# Patient Record
(demographics unavailable — no encounter records)

---

## 2024-10-18 NOTE — HISTORY OF PRESENT ILLNESS
[5] : 5 [Dull/Aching] : dull/aching [] : Post Surgical Visit: yes [de-identified] : R thumb UCL repair 3 weeks ago She is feeling better  [3] : 3 [FreeTextEntry1] : R thumb  [de-identified] : ot brace  [de-identified] : 09/25/24 [de-identified] : R thumb UCL repair

## 2024-11-08 NOTE — HISTORY OF PRESENT ILLNESS
[5] : 5 [3] : 3 [Dull/Aching] : dull/aching [] : Post Surgical Visit: yes [de-identified] : R thumb UCL repair  She is getting better  [FreeTextEntry1] : R thumb  [de-identified] : ot brace  [de-identified] : 09/25/24 [de-identified] : R thumb UCL repair

## 2024-11-08 NOTE — ASSESSMENT
[FreeTextEntry1] : Cont with therapy Activity modification as tolerated Ice as needed NSAIDs as needed Return in 4 weeks

## 2024-12-06 NOTE — HISTORY OF PRESENT ILLNESS
[Dull/Aching] : dull/aching [] : Post Surgical Visit: yes [de-identified] : R thumb UCL repair  9/25 She is feelingbetter Still has stiffness  [4] : 4 [2] : 2 [FreeTextEntry1] : R thumb  [de-identified] : therapy  [de-identified] : 09/25/24 [de-identified] : R thumb UCL repair

## 2025-07-03 NOTE — ASSESSMENT
[FreeTextEntry1] : The patient returns to discuss the left breast implant postmastectomy which she states is causing discomfort and pain.  As result we will schedule her for MRI to evaluate possible rupture.  The patient will also be seeking medical follow-up with her primary care physician and [facility] and is requesting appointment was a [facility] doctor.  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  As result a neurology consult would be scheduled.  Because of the fact that she was diagnosed with breast cancer at an early age less than 50 she would be a candidate for genetic testing which will be scheduled.  She will continue her medical, GI, GYN and surgical follow-up and testing as needed.  She will return to the office on a as needed basis as she has done well with no evidence of recurrent disease. [Curative] : Goals of care discussed with patient: Curative

## 2025-07-03 NOTE — HISTORY OF PRESENT ILLNESS
[de-identified] : This is a 61-year-old woman with history of carcinoma of the breast. Her history dates back to October 2002, when she was found to have a abnormal mammogram and biopsy revealed infiltrating ductal carcinoma. Patient underwent left mastectomy and the tumor was 2 x 1.8 cm with 7/22 positive lymph nodes, ER +80%, NH +80% HER-2 negative. The patient received sequential Adriamycin, Taxol and Cytoxan and subsequently has been well with no evidence of recurrence of her disease. She is now completing 9-1/2 years of aromatase inhibitor with exemestane. She tolerates her treatment well except for joint and bone pain. [de-identified] : 10/02-left breast mass IDC, ER,ND-80, Her neg, 7/22 nodes pos. [de-identified] : 10/02- Left mastx , AC'T chemo AI x 10 yrs. ROSA MARIA. [de-identified] : The patient returns to discuss her implant which is causing discomfort and pain.

## 2025-07-03 NOTE — ASSESSMENT
[FreeTextEntry1] : The patient returns to discuss the left breast implant postmastectomy which she states is causing discomfort and pain.  As result we will schedule her for MRI to evaluate possible rupture.  The patient will also be seeking medical follow-up with her primary care physician and [facility] and is requesting appointment was a [facility] doctor.  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  As result a neurology consult would be scheduled.  Because of the fact that she was diagnosed with breast cancer at an early age less than 50 she would be a candidate for genetic testing which will be scheduled.  She will continue her medical, GI, GYN and surgical follow-up and testing as needed.  She will return to the office on a as needed basis as she has done well with no evidence of recurrent disease. [Curative] : Goals of care discussed with patient: Curative

## 2025-07-03 NOTE — PHYSICAL EXAM
[Restricted in physically strenuous activity but ambulatory and able to carry out work of a light or sedentary nature] : Status 1- Restricted in physically strenuous activity but ambulatory and able to carry out work of a light or sedentary nature, e.g., light house work, office work [Thin] : thin [Normal] : affect appropriate [de-identified] : Left mastectomy and implant reconstruction

## 2025-07-03 NOTE — PHYSICAL EXAM
[Restricted in physically strenuous activity but ambulatory and able to carry out work of a light or sedentary nature] : Status 1- Restricted in physically strenuous activity but ambulatory and able to carry out work of a light or sedentary nature, e.g., light house work, office work [Thin] : thin [Normal] : affect appropriate [de-identified] : Left mastectomy and implant reconstruction

## 2025-07-03 NOTE — REVIEW OF SYSTEMS
[Diarrhea: Grade 0] : Diarrhea: Grade 0 [Negative] : Allergic/Immunologic [Palpitations] : palpitations [FreeTextEntry7] : Last colonoscopy 18 months ago.

## 2025-07-03 NOTE — HISTORY OF PRESENT ILLNESS
[de-identified] : This is a 61-year-old woman with history of carcinoma of the breast. Her history dates back to October 2002, when she was found to have a abnormal mammogram and biopsy revealed infiltrating ductal carcinoma. Patient underwent left mastectomy and the tumor was 2 x 1.8 cm with 7/22 positive lymph nodes, ER +80%, SD +80% HER-2 negative. The patient received sequential Adriamycin, Taxol and Cytoxan and subsequently has been well with no evidence of recurrence of her disease. She is now completing 9-1/2 years of aromatase inhibitor with exemestane. She tolerates her treatment well except for joint and bone pain. [de-identified] : 10/02-left breast mass IDC, ER,CA-80, Her neg, 7/22 nodes pos. [de-identified] : 10/02- Left mastx , AC'T chemo AI x 10 yrs. ROSA MARIA. [de-identified] : The patient returns to discuss her implant which is causing discomfort and pain.